# Patient Record
Sex: FEMALE | Race: WHITE | HISPANIC OR LATINO | Employment: FULL TIME | URBAN - METROPOLITAN AREA
[De-identification: names, ages, dates, MRNs, and addresses within clinical notes are randomized per-mention and may not be internally consistent; named-entity substitution may affect disease eponyms.]

---

## 2018-08-08 ENCOUNTER — OFFICE VISIT (OUTPATIENT)
Dept: GASTROENTEROLOGY | Facility: MEDICAL CENTER | Age: 58
End: 2018-08-08
Payer: COMMERCIAL

## 2018-08-08 VITALS
DIASTOLIC BLOOD PRESSURE: 70 MMHG | HEIGHT: 62 IN | WEIGHT: 144 LBS | HEART RATE: 67 BPM | BODY MASS INDEX: 26.5 KG/M2 | TEMPERATURE: 97.2 F | SYSTOLIC BLOOD PRESSURE: 116 MMHG

## 2018-08-08 DIAGNOSIS — Z83.71 FAMILY HISTORY OF COLONIC POLYPS: ICD-10-CM

## 2018-08-08 DIAGNOSIS — R13.19 ESOPHAGEAL DYSPHAGIA: ICD-10-CM

## 2018-08-08 DIAGNOSIS — R10.13 EPIGASTRIC PAIN: Primary | ICD-10-CM

## 2018-08-08 DIAGNOSIS — K21.9 GASTROESOPHAGEAL REFLUX DISEASE WITHOUT ESOPHAGITIS: ICD-10-CM

## 2018-08-08 PROBLEM — Z83.719 FAMILY HISTORY OF COLONIC POLYPS: Status: ACTIVE | Noted: 2018-08-08

## 2018-08-08 PROCEDURE — 99204 OFFICE O/P NEW MOD 45 MIN: CPT | Performed by: INTERNAL MEDICINE

## 2018-08-08 RX ORDER — OMEPRAZOLE 20 MG/1
20 CAPSULE, DELAYED RELEASE ORAL DAILY
Qty: 30 CAPSULE | Refills: 5 | Status: SHIPPED | OUTPATIENT
Start: 2018-08-08 | End: 2018-08-20 | Stop reason: SDUPTHER

## 2018-08-08 NOTE — LETTER
August 8, 2018     Jennifer Sandhu DO  [address] [place] [address]    Patient: Abraham Aguero   YOB: 1960   Date of Visit: 8/8/2018       Dear Dr Wagner Ozuna: Thank you for referring Abraham Aguero to me for evaluation  Below are my notes for this consultation  If you have questions, please do not hesitate to call me  I look forward to following your patient along with you  Sincerely,        Magdiel Kurtz MD        CC: No Recipients  Magdiel Kurtz MD  8/8/2018 11:08 AM  Sign at close encounter  [facility] - Outpatient Consultation  Abraham Aguero 59 y o  female MRN: [id number]  Encounter: [id number]          ASSESSMENT AND PLAN:      1  Epigastric pain  2  Gastroesophageal reflux disease without esophagitis  3  Esophageal dysphagia  I suspect her symptoms may be due to reflux esophagitis and functional dyspepsia but I am also concerned about peptic ulcer disease given her NSAID use  I will schedule her for an upper endoscopy to evaluate further and dilated the stricture is found  I will also give her a trial of omeprazole daily  - Case request operating room: ESOPHAGOGASTRODUODENOSCOPY (EGD)  - omeprazole (PriLOSEC) 20 mg delayed release capsule; Take 1 capsule (20 mg total) by mouth daily  Dispense: 30 capsule; Refill: 5    4  Family history of colonic polyps  Given her family history of colon polyps in her age, she should have an elective colonoscopy performed  Given the severity of her upper GI symptoms we will hold off on prepping her for colonoscopy now but we can address this after her endoscopy and her symptoms have improved  ______________________________________________________________________    HPI:  She presents for evaluation of approximately two months of reflux, dysphagia for solids, and epigastric pain    The pain is sharp at times but it is not associated with any nausea, vomiting, change in bowel habits, bleeding, or weight loss   She has been taking Tums but has not found any relief  She denies any prior history of an upper endoscopy or colonoscopy but does report a family history of colon polyps  REVIEW OF SYSTEMS:    CONSTITUTIONAL: Denies any fever, chills, rigors, and weight loss  HEENT: No earache or tinnitus  Denies hearing loss or visual disturbances  CARDIOVASCULAR: No chest pain or palpitations  RESPIRATORY: Denies any cough, hemoptysis, shortness of breath or dyspnea on exertion  GASTROINTESTINAL: As noted in the History of Present Illness  GENITOURINARY: No problems with urination  Denies any hematuria or dysuria  NEUROLOGIC: No dizziness or vertigo, denies headaches  MUSCULOSKELETAL: Denies any muscle pain, but has joint pains  SKIN: Denies skin rashes or itching  ENDOCRINE: Denies excessive thirst  Denies intolerance to heat or cold  PSYCHOSOCIAL: Denies depression or anxiety  Denies any recent memory loss  Historical Information   Past Medical History:   Diagnosis Date    GERD (gastroesophageal reflux disease)     Hyperlipidemia      History reviewed  No pertinent surgical history  Social History   History   Alcohol Use    Yes     History   Drug Use No     History   Smoking Status    Current Every Day Smoker   Smokeless Tobacco    Never Used     History reviewed  No pertinent family history  Meds/Allergies       Current Outpatient Prescriptions:     Multiple Vitamins-Minerals (MULTIVITAMIN ADULT PO)    omeprazole (PriLOSEC) 20 mg delayed release capsule    No Known Allergies        Objective     Blood pressure 116/70, pulse 67, temperature (!) 97 2 °F (36 2 °C), temperature source Tympanic, height 5' 2" (1 575 m), weight 65 3 kg (144 lb)  Body mass index is 26 34 kg/m²          PHYSICAL EXAM:      General Appearance:   Alert, cooperative, no distress   HEENT:   Normocephalic, atraumatic, anicteric      Neck:  Supple, symmetrical, trachea midline   Lungs:   Clear to auscultation bilaterally; no rales, rhonchi or wheezing; respirations unlabored    Heart[de-identified]   Regular rate and rhythm; no murmur, rub, or gallop  Abdomen:   Soft, epigastric tenderness, non-distended; normal bowel sounds; no masses, no organomegaly    Genitalia:   Deferred    Rectal:   Deferred    Extremities:  No cyanosis, clubbing or edema    Pulses:  2+ and symmetric    Skin:  No jaundice, rashes, or lesions    Lymph nodes:  No palpable cervical lymphadenopathy        Lab Results:   No visits with results within 1 Day(s) from this visit  Latest known visit with results is:   No results found for any previous visit  Radiology Results:   No results found

## 2018-08-08 NOTE — PATIENT INSTRUCTIONS
The patient is scheduled at [facility] on 8/15/18 with Dr Dipika Shore  Pt is aware of prep and that she will be called the day prior with an arrival time

## 2018-08-08 NOTE — PROGRESS NOTES
[facility] - Outpatient Consultation  [facility] 59 y o  female MRN: [id number]  Encounter: [id number]          ASSESSMENT AND PLAN:      1  Epigastric pain  2  Gastroesophageal reflux disease without esophagitis  3  Esophageal dysphagia  I suspect her symptoms may be due to reflux esophagitis and functional dyspepsia but I am also concerned about peptic ulcer disease given her NSAID use  I will schedule her for an upper endoscopy to evaluate further and dilated the stricture is found  I will also give her a trial of omeprazole daily  - Case request operating room: ESOPHAGOGASTRODUODENOSCOPY (EGD)  - omeprazole (PriLOSEC) 20 mg delayed release capsule; Take 1 capsule (20 mg total) by mouth daily  Dispense: 30 capsule; Refill: 5    4  Family history of colonic polyps  Given her family history of colon polyps in her age, she should have an elective colonoscopy performed  Given the severity of her upper GI symptoms we will hold off on prepping her for colonoscopy now but we can address this after her endoscopy and her symptoms have improved  ______________________________________________________________________    HPI:  She presents for evaluation of approximately two months of reflux, dysphagia for solids, and epigastric pain  The pain is sharp at times but it is not associated with any nausea, vomiting, change in bowel habits, bleeding, or weight loss  She has been taking Tums but has not found any relief  She denies any prior history of an upper endoscopy or colonoscopy but does report a family history of colon polyps  REVIEW OF SYSTEMS:    CONSTITUTIONAL: Denies any fever, chills, rigors, and weight loss  HEENT: No earache or tinnitus  Denies hearing loss or visual disturbances  CARDIOVASCULAR: No chest pain or palpitations  RESPIRATORY: Denies any cough, hemoptysis, shortness of breath or dyspnea on exertion    GASTROINTESTINAL: As noted in the History of Present Illness  GENITOURINARY: No problems with urination  Denies any hematuria or dysuria  NEUROLOGIC: No dizziness or vertigo, denies headaches  MUSCULOSKELETAL: Denies any muscle pain, but has joint pains  SKIN: Denies skin rashes or itching  ENDOCRINE: Denies excessive thirst  Denies intolerance to heat or cold  PSYCHOSOCIAL: Denies depression or anxiety  Denies any recent memory loss  Historical Information   Past Medical History:   Diagnosis Date    GERD (gastroesophageal reflux disease)     Hyperlipidemia      History reviewed  No pertinent surgical history  Social History   History   Alcohol Use    Yes     History   Drug Use No     History   Smoking Status    Current Every Day Smoker   Smokeless Tobacco    Never Used     History reviewed  No pertinent family history  Meds/Allergies       Current Outpatient Prescriptions:     Multiple Vitamins-Minerals (MULTIVITAMIN ADULT PO)    omeprazole (PriLOSEC) 20 mg delayed release capsule    No Known Allergies        Objective     Blood pressure 116/70, pulse 67, temperature (!) 97 2 °F (36 2 °C), temperature source Tympanic, height 5' 2" (1 575 m), weight 65 3 kg (144 lb)  Body mass index is 26 34 kg/m²  PHYSICAL EXAM:      General Appearance:   Alert, cooperative, no distress   HEENT:   Normocephalic, atraumatic, anicteric      Neck:  Supple, symmetrical, trachea midline   Lungs:   Clear to auscultation bilaterally; no rales, rhonchi or wheezing; respirations unlabored    Heart[de-identified]   Regular rate and rhythm; no murmur, rub, or gallop  Abdomen:   Soft, epigastric tenderness, non-distended; normal bowel sounds; no masses, no organomegaly    Genitalia:   Deferred    Rectal:   Deferred    Extremities:  No cyanosis, clubbing or edema    Pulses:  2+ and symmetric    Skin:  No jaundice, rashes, or lesions    Lymph nodes:  No palpable cervical lymphadenopathy        Lab Results:   No visits with results within 1 Day(s) from this visit     Latest known visit with results is:   No results found for any previous visit  Radiology Results:   No results found

## 2018-08-14 ENCOUNTER — ANESTHESIA EVENT (OUTPATIENT)
Dept: GASTROENTEROLOGY | Facility: MEDICAL CENTER | Age: 58
End: 2018-08-14
Payer: COMMERCIAL

## 2018-08-15 ENCOUNTER — HOSPITAL ENCOUNTER (OUTPATIENT)
Facility: MEDICAL CENTER | Age: 58
Setting detail: OUTPATIENT SURGERY
Discharge: HOME | End: 2018-08-15
Attending: INTERNAL MEDICINE | Admitting: INTERNAL MEDICINE
Payer: COMMERCIAL

## 2018-08-15 ENCOUNTER — ANESTHESIA (OUTPATIENT)
Dept: GASTROENTEROLOGY | Facility: MEDICAL CENTER | Age: 58
End: 2018-08-15
Payer: COMMERCIAL

## 2018-08-15 VITALS
BODY MASS INDEX: 27.75 KG/M2 | RESPIRATION RATE: 24 BRPM | TEMPERATURE: 97.3 F | SYSTOLIC BLOOD PRESSURE: 192 MMHG | HEIGHT: 61 IN | OXYGEN SATURATION: 99 % | DIASTOLIC BLOOD PRESSURE: 96 MMHG | WEIGHT: 147 LBS | HEART RATE: 62 BPM

## 2018-08-15 DIAGNOSIS — K21.9 GASTROESOPHAGEAL REFLUX DISEASE WITHOUT ESOPHAGITIS: ICD-10-CM

## 2018-08-15 DIAGNOSIS — R10.13 EPIGASTRIC PAIN: ICD-10-CM

## 2018-08-15 DIAGNOSIS — R13.19 ESOPHAGEAL DYSPHAGIA: ICD-10-CM

## 2018-08-15 PROCEDURE — 88305 TISSUE EXAM BY PATHOLOGIST: CPT | Performed by: PATHOLOGY

## 2018-08-15 PROCEDURE — 88342 IMHCHEM/IMCYTCHM 1ST ANTB: CPT | Performed by: PATHOLOGY

## 2018-08-15 PROCEDURE — 43239 EGD BIOPSY SINGLE/MULTIPLE: CPT | Performed by: INTERNAL MEDICINE

## 2018-08-15 RX ORDER — PROPOFOL 10 MG/ML
INJECTION, EMULSION INTRAVENOUS AS NEEDED
Status: DISCONTINUED | OUTPATIENT
Start: 2018-08-15 | End: 2018-08-15 | Stop reason: SURG

## 2018-08-15 RX ORDER — LANOLIN ALCOHOL/MO/W.PET/CERES
CREAM (GRAM) TOPICAL
COMMUNITY

## 2018-08-15 RX ORDER — LIDOCAINE HYDROCHLORIDE 20 MG/ML
INJECTION, SOLUTION EPIDURAL; INFILTRATION; INTRACAUDAL; PERINEURAL AS NEEDED
Status: DISCONTINUED | OUTPATIENT
Start: 2018-08-15 | End: 2018-08-15 | Stop reason: SURG

## 2018-08-15 RX ORDER — SODIUM CHLORIDE 9 MG/ML
125 INJECTION, SOLUTION INTRAVENOUS CONTINUOUS
Status: DISCONTINUED | OUTPATIENT
Start: 2018-08-15 | End: 2018-08-15 | Stop reason: HOSPADM

## 2018-08-15 RX ORDER — CHOLECALCIFEROL (VITAMIN D3) 125 MCG
2000 CAPSULE ORAL DAILY
COMMUNITY

## 2018-08-15 RX ADMIN — PROPOFOL 50 MG: 10 INJECTION, EMULSION INTRAVENOUS at 13:26

## 2018-08-15 RX ADMIN — SODIUM CHLORIDE 125 ML/HR: 0.9 INJECTION, SOLUTION INTRAVENOUS at 12:20

## 2018-08-15 RX ADMIN — LIDOCAINE HYDROCHLORIDE 5 ML: 20 INJECTION, SOLUTION EPIDURAL; INFILTRATION; INTRACAUDAL; PERINEURAL at 13:21

## 2018-08-15 RX ADMIN — PROPOFOL 150 MG: 10 INJECTION, EMULSION INTRAVENOUS at 13:21

## 2018-08-15 NOTE — H&P (VIEW-ONLY)
[facility] - Outpatient Consultation  [facility] 59 y o  female MRN: [id number]  Encounter: [id number]          ASSESSMENT AND PLAN:      1  Epigastric pain  2  Gastroesophageal reflux disease without esophagitis  3  Esophageal dysphagia  I suspect her symptoms may be due to reflux esophagitis and functional dyspepsia but I am also concerned about peptic ulcer disease given her NSAID use  I will schedule her for an upper endoscopy to evaluate further and dilated the stricture is found  I will also give her a trial of omeprazole daily  - Case request operating room: ESOPHAGOGASTRODUODENOSCOPY (EGD)  - omeprazole (PriLOSEC) 20 mg delayed release capsule; Take 1 capsule (20 mg total) by mouth daily  Dispense: 30 capsule; Refill: 5    4  Family history of colonic polyps  Given her family history of colon polyps in her age, she should have an elective colonoscopy performed  Given the severity of her upper GI symptoms we will hold off on prepping her for colonoscopy now but we can address this after her endoscopy and her symptoms have improved  ______________________________________________________________________    HPI:  She presents for evaluation of approximately two months of reflux, dysphagia for solids, and epigastric pain  The pain is sharp at times but it is not associated with any nausea, vomiting, change in bowel habits, bleeding, or weight loss  She has been taking Tums but has not found any relief  She denies any prior history of an upper endoscopy or colonoscopy but does report a family history of colon polyps  REVIEW OF SYSTEMS:    CONSTITUTIONAL: Denies any fever, chills, rigors, and weight loss  HEENT: No earache or tinnitus  Denies hearing loss or visual disturbances  CARDIOVASCULAR: No chest pain or palpitations  RESPIRATORY: Denies any cough, hemoptysis, shortness of breath or dyspnea on exertion    GASTROINTESTINAL: As noted in the History of Present Illness  GENITOURINARY: No problems with urination  Denies any hematuria or dysuria  NEUROLOGIC: No dizziness or vertigo, denies headaches  MUSCULOSKELETAL: Denies any muscle pain, but has joint pains  SKIN: Denies skin rashes or itching  ENDOCRINE: Denies excessive thirst  Denies intolerance to heat or cold  PSYCHOSOCIAL: Denies depression or anxiety  Denies any recent memory loss  Historical Information   Past Medical History:   Diagnosis Date    GERD (gastroesophageal reflux disease)     Hyperlipidemia      History reviewed  No pertinent surgical history  Social History   History   Alcohol Use    Yes     History   Drug Use No     History   Smoking Status    Current Every Day Smoker   Smokeless Tobacco    Never Used     History reviewed  No pertinent family history  Meds/Allergies       Current Outpatient Prescriptions:     Multiple Vitamins-Minerals (MULTIVITAMIN ADULT PO)    omeprazole (PriLOSEC) 20 mg delayed release capsule    No Known Allergies        Objective     Blood pressure 116/70, pulse 67, temperature (!) 97 2 °F (36 2 °C), temperature source Tympanic, height 5' 2" (1 575 m), weight 65 3 kg (144 lb)  Body mass index is 26 34 kg/m²  PHYSICAL EXAM:      General Appearance:   Alert, cooperative, no distress   HEENT:   Normocephalic, atraumatic, anicteric      Neck:  Supple, symmetrical, trachea midline   Lungs:   Clear to auscultation bilaterally; no rales, rhonchi or wheezing; respirations unlabored    Heart[de-identified]   Regular rate and rhythm; no murmur, rub, or gallop  Abdomen:   Soft, epigastric tenderness, non-distended; normal bowel sounds; no masses, no organomegaly    Genitalia:   Deferred    Rectal:   Deferred    Extremities:  No cyanosis, clubbing or edema    Pulses:  2+ and symmetric    Skin:  No jaundice, rashes, or lesions    Lymph nodes:  No palpable cervical lymphadenopathy        Lab Results:   No visits with results within 1 Day(s) from this visit     Latest known visit with results is:   No results found for any previous visit  Radiology Results:   No results found

## 2018-08-15 NOTE — OP NOTE
**** GI/ENDOSCOPY REPORT ****     PATIENT NAME: Levar Shah - VISIT ID:  Patient ID: [id number] YOB: 1960     INTRODUCTION: Esophagogastroduodenoscopy - A 59 female patient presents   for an outpatient Esophagogastroduodenoscopy at [address] [place] [address]  INDICATIONS: GERD  Dysphagia  Pain located in the epigastrium  CONSENT: The benefits, risks, and alternatives to the procedure were   discussed and informed consent was obtained from the patient  PREPARATION:  EKG, pulse, pulse oximetry and blood pressure were monitored   throughout the procedure  ASA Classification: Class 2 - Patient has mild   to moderate systemic disturbance that may or may not be related to the   disorder requiring surgery  MEDICATIONS: Anesthesia-check records     PROCEDURE:  The endoscope was passed without difficulty through the mouth   under direct visualization and advanced to the 2nd portion of the   duodenum  The scope was withdrawn and the mucosa was carefully examined  Retroflexion was performed  FINDINGS:   Esophagus: 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  Multiple random   biopsies was taken from the proximal third of the esophagus  GE junction:   The GE junction appeared to be normal    Stomach: The stomach appeared to   be normal      Multiple random biopsies was taken  Duodenum: A single   sessile polyp (measuring 6 mm in size) was found in the duodenal bulb  The polyp was removed by polypectomy with a cold snare  COMPLICATIONS: There were no complications  IMPRESSIONS: 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  Normal GE junction  Normal   stomach  Multiple biopsies taken  A polyp found in the duodenal bulb  Polypectomy was performed  RECOMMENDATIONS: Anti-reflux measures: Raise the head of the bed 4 to 6   inches  Avoid smoking  Avoid excess coffee, tea or other caffeinated   beverages  Avoid garments that fit tightly through the abdomen   Avoid   eating before bed  Continue current medications  Follow-up on the results   of the biopsy specimens  Follow-up appointment with endoscopist  If   biopsies are negative, will refer for esophageal manometry study  ESTIMATED BLOOD LOSS:     PATHOLOGY SPECIMENS: Multiple random biopsies taken from the proximal   third of the esophagus  Multiple random biopsies taken  Polypectomy   performed, using, cold snare  PROCEDURE CODES:     ICD-9 Codes: 530 81 Esophageal reflux 787 2 DYSPHAGIA 789 06 Abdominal   pain, epigastric 211 2 Benign neoplasm of duodenum, jejunum, and ileum     ICD-10 Codes: K21 Gastro-esophageal reflux disease R13 10 Dysphagia,   unspecified R10 13 Epigastric pain U47 7 Neoplasm of uncertain behavior of   small intestine     PERFORMED BY: KIRILL Vela  on 08/15/2018  Version 1, electronically signed by KIRILL Ramirez  on 08/15/2018   at 13:45

## 2018-08-15 NOTE — ANESTHESIA PREPROCEDURE EVALUATION
Review of Systems/Medical History  Patient summary reviewed        Cardiovascular  Hyperlipidemia,    Pulmonary  Smoker cigarette smoker  , Tobacco cessation counseling given ,        GI/Hepatic    GERD poorly controlled,        Negative  ROS        Endo/Other  Negative endo/other ROS      GYN  Negative gynecology ROS          Hematology  Negative hematology ROS      Musculoskeletal  Negative musculoskeletal ROS        Neurology  Negative neurology ROS      Psychology   Negative psychology ROS              Physical Exam    Airway    Mallampati score: I  TM Distance: >3 FB  Neck ROM: full     Dental   No notable dental hx     Cardiovascular  Rhythm: regular, Rate: normal, Cardiovascular exam normal    Pulmonary  Pulmonary exam normal Breath sounds clear to auscultation,     Other Findings        Anesthesia Plan  ASA Score- 2     Anesthesia Type- IV sedation with anesthesia with ASA Monitors  Additional Monitors:   Airway Plan:         Plan Factors- Patient instructed to abstain from smoking on day of procedure  Patient did not smoke on day of surgery  Induction- intravenous  Postoperative Plan-     Informed Consent- Anesthetic plan and risks discussed with patient

## 2018-08-15 NOTE — DISCHARGE INSTRUCTIONS
Upper Endoscopy   WHAT YOU NEED TO KNOW:   An upper endoscopy is also called an upper gastrointestinal (GI) endoscopy, or an esophagogastroduodenoscopy (EGD)  You may feel bloated, gassy, or have some abdominal discomfort after your procedure  Your throat may be sore for 24 to 36 hours  You may burp or pass gas from air that is still inside your body  DISCHARGE INSTRUCTIONS:   Call 911 for any of the following:   · You have sudden chest pain or trouble breathing  Seek care immediately if:   · You feel dizzy or faint  · You have trouble swallowing  · Your bowel movements are very dark or black  · Your abdomen is hard and firm and you have severe pain  · You vomit blood  Contact your healthcare provider if:   · You feel full or bloated and cannot burp or pass gas  · You have not had a bowel movement for 3 days after your procedure  · You have neck pain  · You have a fever or chills  · You have nausea or are vomiting  · You have a rash or hives  · You have questions or concerns about your endoscopy  Relieve a sore throat:  Suck on throat lozenges or crushed ice  Gargle with a small amount of warm salt water  Mix 1 teaspoon of salt and 1 cup of warm water to make salt water  Relieve gas and discomfort from bloating:  Lie on your right side with a heating pad on your abdomen  Take short walks to help pass gas  Eat small meals until bloating is relieved  Rest after your procedure: You have been given medicine to relax you  Do not  drive or make important decisions until the day after your procedure  Return to your normal activity as directed  You can usually return to work the day after your procedure  Follow up with your healthcare provider as directed:  Write down your questions so you remember to ask them during your visits     © 2017 [address] is for End User's use only and may not be sold, redistributed or otherwise used for commercial purposes  All illustrations and images included in CareNotes® are the copyrighted property of A D A M [employer] Inc  or [employer]  The above information is an  only  It is not intended as medical advice for individual conditions or treatments  Talk to your doctor, nurse or pharmacist before following any medical regimen to see if it is safe and effective for you

## 2018-08-15 NOTE — ANESTHESIA POSTPROCEDURE EVALUATION
Post-Op Assessment Note      CV Status:  Stable    Mental Status:  Alert and awake    Hydration Status:  Euvolemic    PONV Controlled:  Controlled    Airway Patency:  Patent    Post Op Vitals Reviewed: Yes          Staff: Anesthesiologist           BP     Temp     Pulse     Resp     SpO2

## 2018-08-16 ENCOUNTER — TELEPHONE (OUTPATIENT)
Dept: GASTROENTEROLOGY | Facility: CLINIC | Age: 58
End: 2018-08-16

## 2018-08-16 NOTE — TELEPHONE ENCOUNTER
Looks like it was originally prescribed at her office visit  There were no findings of inflammation on EGD so she does not have to start it if she does not want to, but Dr Mireille Fisher felt it may be helpful for her symptoms  She can try it, or if she is feeling better she can hold off  Thanks!

## 2018-08-16 NOTE — TELEPHONE ENCOUNTER
Dr Pavel Cutler pt    Pt called in, she is questioning if she should be starting Omeprazole or holding off on this medication   It was prescribed to her yesterday, after her -[phone]

## 2018-08-19 NOTE — PROGRESS NOTES
Gastric biopsy was positive for H pylori  My physician assistant will let her know that she is positive for H pylori and initiate treatment

## 2018-08-20 DIAGNOSIS — A04.8 H. PYLORI INFECTION: Primary | ICD-10-CM

## 2018-08-20 DIAGNOSIS — R10.13 EPIGASTRIC PAIN: ICD-10-CM

## 2018-08-20 DIAGNOSIS — R13.19 ESOPHAGEAL DYSPHAGIA: ICD-10-CM

## 2018-08-20 DIAGNOSIS — K21.9 GASTROESOPHAGEAL REFLUX DISEASE WITHOUT ESOPHAGITIS: ICD-10-CM

## 2018-08-20 RX ORDER — AMOXICILLIN 500 MG/1
1000 CAPSULE ORAL EVERY 12 HOURS SCHEDULED
Qty: 56 CAPSULE | Refills: 0 | Status: SHIPPED | OUTPATIENT
Start: 2018-08-20 | End: 2018-09-03

## 2018-08-20 RX ORDER — OMEPRAZOLE 20 MG/1
20 CAPSULE, DELAYED RELEASE ORAL 2 TIMES DAILY
Qty: 30 CAPSULE | Refills: 0 | Status: SHIPPED | OUTPATIENT
Start: 2018-08-20

## 2018-08-20 RX ORDER — CLARITHROMYCIN 500 MG/1
500 TABLET, COATED ORAL EVERY 12 HOURS SCHEDULED
Qty: 28 TABLET | Refills: 0 | Status: SHIPPED | OUTPATIENT
Start: 2018-08-20 | End: 2018-09-03

## 2018-08-20 NOTE — TELEPHONE ENCOUNTER
Pt called requesting information on why she was prescribed medications today because DR Amie López advised her that she will not go on medication

## 2018-08-24 ENCOUNTER — TELEPHONE (OUTPATIENT)
Dept: GASTROENTEROLOGY | Facility: CLINIC | Age: 58
End: 2018-08-24

## 2018-08-24 NOTE — TELEPHONE ENCOUNTER
José Rain patient      She picked up her medication and has questions regarding the directions CALLBACK # [phone]

## 2018-10-02 ENCOUNTER — OFFICE VISIT (OUTPATIENT)
Dept: GASTROENTEROLOGY | Facility: MEDICAL CENTER | Age: 58
End: 2018-10-02
Payer: COMMERCIAL

## 2018-10-02 VITALS
SYSTOLIC BLOOD PRESSURE: 118 MMHG | DIASTOLIC BLOOD PRESSURE: 70 MMHG | WEIGHT: 142 LBS | BODY MASS INDEX: 26.81 KG/M2 | TEMPERATURE: 96.9 F | HEIGHT: 61 IN | HEART RATE: 77 BPM

## 2018-10-02 DIAGNOSIS — A04.8 H. PYLORI INFECTION: ICD-10-CM

## 2018-10-02 DIAGNOSIS — R10.13 EPIGASTRIC PAIN: ICD-10-CM

## 2018-10-02 DIAGNOSIS — Z83.71 FAMILY HISTORY OF COLONIC POLYPS: ICD-10-CM

## 2018-10-02 DIAGNOSIS — R13.19 ESOPHAGEAL DYSPHAGIA: ICD-10-CM

## 2018-10-02 DIAGNOSIS — K21.9 GASTROESOPHAGEAL REFLUX DISEASE WITHOUT ESOPHAGITIS: Primary | ICD-10-CM

## 2018-10-02 PROCEDURE — 99214 OFFICE O/P EST MOD 30 MIN: CPT | Performed by: INTERNAL MEDICINE

## 2018-10-02 RX ORDER — POLYETHYLENE GLYCOL 3350, SODIUM SULFATE, SODIUM CHLORIDE, POTASSIUM CHLORIDE, ASCORBIC ACID, SODIUM ASCORBATE 7.5-2.691G
2000 KIT ORAL ONCE
Qty: 2000 ML | Refills: 0 | Status: SHIPPED | OUTPATIENT
Start: 2018-10-02 | End: 2018-10-02

## 2018-10-02 NOTE — LETTER
October 2, 2018     Nelta Litten, DO  [address] [facility] [address]    Patient: Man Reaves   YOB: 1960   Date of Visit: 10/2/2018       Dear Dr Scot Sofia: Thank you for referring Man Reaves to me for evaluation  Below are my notes for this consultation  If you have questions, please do not hesitate to call me  I look forward to following your patient along with you  Sincerely,        Ale Mendez MD        CC: No Recipients  Ale Mendez MD  10/2/2018  9:15 AM  Sign at close encounter  [facility] - Outpatient Follow-up Note  Man Reaves 59 y o  female MRN: [id number]  Encounter: [id number]          ASSESSMENT AND PLAN:      1  Gastroesophageal reflux disease without esophagitis  2  Esophageal dysphagia  3  Epigastric pain  4  H  pylori infection    I suspect her symptoms were mainly due to reflux and her Helicobacter pylori infection  I am very happy to hear she is feeling better now  She will stop taking the Prilosec at the end of October and then have her Helicobacter pylori stool antigen checked about two weeks later  If she has a recurrence of her reflux symptoms are epigastric pain that she can restart the Prilosec after her stool test   - H  pylori antigen, stool; Future    5  Family history of colonic polyps    She has a family history of colon polyps so her next colonoscopy should be in approximately five years or sooner if clinically indicated     ______________________________________________________________________    SUBJECTIVE:  She presents for follow-up after recent upper endoscopy and colonoscopy because of reflux, difficulty swallowing, epigastric pain, and a family history of colon polyps  Her colonoscopy was unremarkable and she was found to have Helicobacter pylori infection on the upper endoscopy  The rest of her biopsies were negative    She has been taking Prilosec and she took triple therapy for the infection for about two weeks and felt her symptoms completely resolved  She is now asymptomatic on the Prilosec  REVIEW OF SYSTEMS IS OTHERWISE NEGATIVE  Historical Information   Past Medical History:   Diagnosis Date    GERD (gastroesophageal reflux disease)     Hyperlipidemia      Past Surgical History:   Procedure Laterality Date    COMBINED REDUCTION MAMMAPLASTY W/ LIPOSUCTION      MT ESOPHAGOGASTRODUODENOSCOPY TRANSORAL DIAGNOSTIC N/A 8/15/2018    Procedure: ESOPHAGOGASTRODUODENOSCOPY (EGD); Surgeon: Ismael Craig MD;  Location: [facility]; Service: Gastroenterology     Social History   History   Alcohol Use    Yes     History   Drug Use No     History   Smoking Status    Current Every Day Smoker    Packs/day: 0 25   Smokeless Tobacco    Never Used     History reviewed  No pertinent family history  Meds/Allergies       Current Outpatient Prescriptions:     Biotin 300 MCG TABS    Cholecalciferol (VITAMIN D3) 2000 units TABS    Multiple Vitamins-Minerals (MULTIVITAMIN ADULT PO)    omeprazole (PriLOSEC) 20 mg delayed release capsule    No Known Allergies        Objective     Blood pressure 118/70, pulse 77, temperature (!) 96 9 °F (36 1 °C), temperature source Tympanic, height 5' 1" (1 549 m), weight 64 4 kg (142 lb)  Body mass index is 26 83 kg/m²  PHYSICAL EXAM:      General Appearance:   Alert, cooperative, no distress   HEENT:   Normocephalic, atraumatic, anicteric      Neck:  Supple, symmetrical, trachea midline   Lungs:   Clear to auscultation bilaterally; no rales, rhonchi or wheezing; respirations unlabored    Heart[de-identified]   Regular rate and rhythm; no murmur, rub, or gallop     Abdomen:   Soft, non-tender, non-distended; normal bowel sounds; no masses, no organomegaly    Genitalia:   Deferred    Rectal:   Deferred    Extremities:  No cyanosis, clubbing or edema    Pulses:  2+ and symmetric    Skin:  No jaundice, rashes, or lesions    Lymph nodes:  No palpable cervical lymphadenopathy        Lab Results:   No visits with results within 1 Day(s) from this visit  Latest known visit with results is:   Admission on 08/15/2018, Discharged on 08/15/2018   Component Date Value    Case Report 08/15/2018                      Value:Surgical Pathology Report                         Case: [id number]                                   Authorizing Provider:  Krystal Andujar MD           Collected:           08/15/2018 1327              Ordering Location:     [facility]        Received:            08/15/2018 [facility]                                                     Pathologist:           Janie Sandhu MD                                                        Specimens:   A) - Polyp, Stomach/Small Intestine, duodenal polyp cold snare                                      B) - Stomach, gastric bx's r/o h pylori                                                             C) - Esophagus, proximal esophagus bx's r/o EOE                                            Final Diagnosis 08/15/2018                      Value: This result contains rich text formatting which cannot be displayed here   Additional Information 08/15/2018                      Value: This result contains rich text formatting which cannot be displayed here  Lacy Hampton Gross Description 08/15/2018                      Value: This result contains rich text formatting which cannot be displayed here  Radiology Results:   No results found

## 2018-10-02 NOTE — PROGRESS NOTES
[facility] - Outpatient Follow-up Note  Estee Ibrahim 59 y o  female MRN: [id number]  Encounter: [id number]          ASSESSMENT AND PLAN:      1  Gastroesophageal reflux disease without esophagitis  2  Esophageal dysphagia  3  Epigastric pain  4  H  pylori infection    I suspect her symptoms were mainly due to reflux and her Helicobacter pylori infection  I am very happy to hear she is feeling better now  She will stop taking the Prilosec at the end of October and then have her Helicobacter pylori stool antigen checked about two weeks later  If she has a recurrence of her reflux symptoms are epigastric pain that she can restart the Prilosec after her stool test   - H  pylori antigen, stool; Future    5  Family history of colonic polyps    She has a family history of colon polyps so I will schedule her for colonoscopy now     ______________________________________________________________________    SUBJECTIVE:  She presents for follow-up after recent upper endoscopy and colonoscopy because of reflux, difficulty swallowing, epigastric pain, and a family history of colon polyps  She was found to have Helicobacter pylori infection on the upper endoscopy  The rest of her biopsies were negative  She has been taking Prilosec and she took triple therapy for the infection for about two weeks and felt her symptoms completely resolved  She is now asymptomatic on the Prilosec  REVIEW OF SYSTEMS IS OTHERWISE NEGATIVE  Historical Information   Past Medical History:   Diagnosis Date    GERD (gastroesophageal reflux disease)     Hyperlipidemia      Past Surgical History:   Procedure Laterality Date    COMBINED REDUCTION MAMMAPLASTY W/ LIPOSUCTION      OR ESOPHAGOGASTRODUODENOSCOPY TRANSORAL DIAGNOSTIC N/A 8/15/2018    Procedure: ESOPHAGOGASTRODUODENOSCOPY (EGD); Surgeon: Alberto Ervin MD;  Location: [facility];   Service: Gastroenterology     Social History   History   Alcohol Use  Yes     History   Drug Use No     History   Smoking Status    Current Every Day Smoker    Packs/day: 0 25   Smokeless Tobacco    Never Used     History reviewed  No pertinent family history  Meds/Allergies       Current Outpatient Prescriptions:     Biotin 300 MCG TABS    Cholecalciferol (VITAMIN D3) 2000 units TABS    Multiple Vitamins-Minerals (MULTIVITAMIN ADULT PO)    omeprazole (PriLOSEC) 20 mg delayed release capsule    No Known Allergies        Objective     Blood pressure 118/70, pulse 77, temperature (!) 96 9 °F (36 1 °C), temperature source Tympanic, height 5' 1" (1 549 m), weight 64 4 kg (142 lb)  Body mass index is 26 83 kg/m²  PHYSICAL EXAM:      General Appearance:   Alert, cooperative, no distress   HEENT:   Normocephalic, atraumatic, anicteric      Neck:  Supple, symmetrical, trachea midline   Lungs:   Clear to auscultation bilaterally; no rales, rhonchi or wheezing; respirations unlabored    Heart[de-identified]   Regular rate and rhythm; no murmur, rub, or gallop  Abdomen:   Soft, non-tender, non-distended; normal bowel sounds; no masses, no organomegaly    Genitalia:   Deferred    Rectal:   Deferred    Extremities:  No cyanosis, clubbing or edema    Pulses:  2+ and symmetric    Skin:  No jaundice, rashes, or lesions    Lymph nodes:  No palpable cervical lymphadenopathy        Lab Results:   No visits with results within 1 Day(s) from this visit     Latest known visit with results is:   Admission on 08/15/2018, Discharged on 08/15/2018   Component Date Value    Case Report 08/15/2018                      Value:Surgical Pathology Report                         Case: [id number]                                   Authorizing Provider:  Alberto Ervin MD           Collected:           08/15/2018 1327              Ordering Location:     [facility]        Received:            08/15/2018 [facility] Pathologist:           Elenita Melton MD                                                        Specimens:   A) - Polyp, Stomach/Small Intestine, duodenal polyp cold snare                                      B) - Stomach, gastric bx's r/o h pylori                                                             C) - Esophagus, proximal esophagus bx's r/o EOE                                            Final Diagnosis 08/15/2018                      Value: This result contains rich text formatting which cannot be displayed here   Additional Information 08/15/2018                      Value: This result contains rich text formatting which cannot be displayed here  [facility] Gross Description 08/15/2018                      Value: This result contains rich text formatting which cannot be displayed here  Radiology Results:   No results found

## 2018-10-02 NOTE — LETTER
October 2, 2018     Anastasiya LundborgDO  [address] [facility] [address]    Patient: Rosario Hutson   YOB: 1960   Date of Visit: 10/2/2018       Dear Dr Jose Rafael Olvera: Thank you for referring Rosario Hutson to me for evaluation  Below are my notes for this consultation  If you have questions, please do not hesitate to call me  I look forward to following your patient along with you  Sincerely,        Zabrina Metcalf MD        CC: No Recipients  Zabrina Metcalf MD  10/2/2018  9:20 AM  Sign at close encounter  [facility] - Outpatient Follow-up Note  Rosario Hutson 59 y o  female MRN: [id number]  Encounter: [id number]          ASSESSMENT AND PLAN:      1  Gastroesophageal reflux disease without esophagitis  2  Esophageal dysphagia  3  Epigastric pain  4  H  pylori infection    I suspect her symptoms were mainly due to reflux and her Helicobacter pylori infection  I am very happy to hear she is feeling better now  She will stop taking the Prilosec at the end of October and then have her Helicobacter pylori stool antigen checked about two weeks later  If she has a recurrence of her reflux symptoms are epigastric pain that she can restart the Prilosec after her stool test   - H  pylori antigen, stool; Future    5  Family history of colonic polyps    She has a family history of colon polyps so I will schedule her for colonoscopy now     ______________________________________________________________________    SUBJECTIVE:  She presents for follow-up after recent upper endoscopy and colonoscopy because of reflux, difficulty swallowing, epigastric pain, and a family history of colon polyps  Her colonoscopy was unremarkable and she was found to have Helicobacter pylori infection on the upper endoscopy  The rest of her biopsies were negative    She has been taking Prilosec and she took triple therapy for the infection for about two weeks and felt her symptoms completely resolved  She is now asymptomatic on the Prilosec  REVIEW OF SYSTEMS IS OTHERWISE NEGATIVE  Historical Information   Past Medical History:   Diagnosis Date    GERD (gastroesophageal reflux disease)     Hyperlipidemia      Past Surgical History:   Procedure Laterality Date    COMBINED REDUCTION MAMMAPLASTY W/ LIPOSUCTION      MS ESOPHAGOGASTRODUODENOSCOPY TRANSORAL DIAGNOSTIC N/A 8/15/2018    Procedure: ESOPHAGOGASTRODUODENOSCOPY (EGD); Surgeon: Jacklyn Souza MD;  Location: [facility]; Service: Gastroenterology     Social History   History   Alcohol Use    Yes     History   Drug Use No     History   Smoking Status    Current Every Day Smoker    Packs/day: 0 25   Smokeless Tobacco    Never Used     History reviewed  No pertinent family history  Meds/Allergies       Current Outpatient Prescriptions:     Biotin 300 MCG TABS    Cholecalciferol (VITAMIN D3) 2000 units TABS    Multiple Vitamins-Minerals (MULTIVITAMIN ADULT PO)    omeprazole (PriLOSEC) 20 mg delayed release capsule    No Known Allergies        Objective     Blood pressure 118/70, pulse 77, temperature (!) 96 9 °F (36 1 °C), temperature source Tympanic, height 5' 1" (1 549 m), weight 64 4 kg (142 lb)  Body mass index is 26 83 kg/m²  PHYSICAL EXAM:      General Appearance:   Alert, cooperative, no distress   HEENT:   Normocephalic, atraumatic, anicteric      Neck:  Supple, symmetrical, trachea midline   Lungs:   Clear to auscultation bilaterally; no rales, rhonchi or wheezing; respirations unlabored    Heart[de-identified]   Regular rate and rhythm; no murmur, rub, or gallop     Abdomen:   Soft, non-tender, non-distended; normal bowel sounds; no masses, no organomegaly    Genitalia:   Deferred    Rectal:   Deferred    Extremities:  No cyanosis, clubbing or edema    Pulses:  2+ and symmetric    Skin:  No jaundice, rashes, or lesions    Lymph nodes:  No palpable cervical lymphadenopathy        Lab Results:   No visits with results within 1 Day(s) from this visit  Latest known visit with results is:   Admission on 08/15/2018, Discharged on 08/15/2018   Component Date Value    Case Report 08/15/2018                      Value:Surgical Pathology Report                         Case: [id number]                                   Authorizing Provider:  Elen Rios MD           Collected:           08/15/2018 1327              [facility]        Received:            08/15/2018 [address] [facility]                                                     Pathologist:           Chetna Perdomo MD                                                        Specimens:   A) - Polyp, Stomach/Small Intestine, duodenal polyp cold snare                                      B) - Stomach, gastric bx's r/o h pylori                                                             C) - Esophagus, proximal esophagus bx's r/o EOE                                            Final Diagnosis 08/15/2018                      Value: This result contains rich text formatting which cannot be displayed here   Additional Information 08/15/2018                      Value: This result contains rich text formatting which cannot be displayed here  Dane [facility] Gross Description 08/15/2018                      Value: This result contains rich text formatting which cannot be displayed here  Radiology Results:   No results found

## 2018-10-02 NOTE — PATIENT INSTRUCTIONS
The patient is scheduled at [facility] on 10/23/18 with Dr Bettye Emanuel  Mov[employer] instructions were gone over with by the MA  The pt is aware she will be called the day prior with an arrival time

## 2018-10-22 ENCOUNTER — ANESTHESIA EVENT (OUTPATIENT)
Dept: GASTROENTEROLOGY | Facility: MEDICAL CENTER | Age: 58
End: 2018-10-22
Payer: COMMERCIAL

## 2018-10-23 ENCOUNTER — HOSPITAL ENCOUNTER (OUTPATIENT)
Facility: MEDICAL CENTER | Age: 58
Setting detail: OUTPATIENT SURGERY
Discharge: HOME | End: 2018-10-23
Attending: INTERNAL MEDICINE | Admitting: INTERNAL MEDICINE
Payer: COMMERCIAL

## 2018-10-23 ENCOUNTER — ANESTHESIA (OUTPATIENT)
Dept: GASTROENTEROLOGY | Facility: MEDICAL CENTER | Age: 58
End: 2018-10-23
Payer: COMMERCIAL

## 2018-10-23 VITALS
BODY MASS INDEX: 26.81 KG/M2 | HEIGHT: 61 IN | HEART RATE: 62 BPM | RESPIRATION RATE: 22 BRPM | OXYGEN SATURATION: 98 % | WEIGHT: 142 LBS | SYSTOLIC BLOOD PRESSURE: 163 MMHG | TEMPERATURE: 98.4 F | DIASTOLIC BLOOD PRESSURE: 73 MMHG

## 2018-10-23 DIAGNOSIS — Z83.71 FAMILY HISTORY OF COLONIC POLYPS: ICD-10-CM

## 2018-10-23 PROCEDURE — 45385 COLONOSCOPY W/LESION REMOVAL: CPT | Performed by: INTERNAL MEDICINE

## 2018-10-23 PROCEDURE — 88305 TISSUE EXAM BY PATHOLOGIST: CPT | Performed by: PATHOLOGY

## 2018-10-23 PROCEDURE — 45380 COLONOSCOPY AND BIOPSY: CPT | Performed by: INTERNAL MEDICINE

## 2018-10-23 RX ORDER — PROPOFOL 10 MG/ML
INJECTION, EMULSION INTRAVENOUS AS NEEDED
Status: DISCONTINUED | OUTPATIENT
Start: 2018-10-23 | End: 2018-10-23 | Stop reason: SURG

## 2018-10-23 RX ORDER — LIDOCAINE HYDROCHLORIDE 20 MG/ML
INJECTION, SOLUTION EPIDURAL; INFILTRATION; INTRACAUDAL; PERINEURAL AS NEEDED
Status: DISCONTINUED | OUTPATIENT
Start: 2018-10-23 | End: 2018-10-23 | Stop reason: SURG

## 2018-10-23 RX ORDER — SODIUM CHLORIDE 9 MG/ML
125 INJECTION, SOLUTION INTRAVENOUS CONTINUOUS
Status: DISCONTINUED | OUTPATIENT
Start: 2018-10-23 | End: 2018-10-23 | Stop reason: HOSPADM

## 2018-10-23 RX ADMIN — PROPOFOL 50 MG: 10 INJECTION, EMULSION INTRAVENOUS at 14:55

## 2018-10-23 RX ADMIN — PROPOFOL 150 MG: 10 INJECTION, EMULSION INTRAVENOUS at 14:49

## 2018-10-23 RX ADMIN — SODIUM CHLORIDE 125 ML/HR: 0.9 INJECTION, SOLUTION INTRAVENOUS at 14:21

## 2018-10-23 RX ADMIN — LIDOCAINE HYDROCHLORIDE 100 MG: 20 INJECTION, SOLUTION EPIDURAL; INFILTRATION; INTRACAUDAL; PERINEURAL at 14:49

## 2018-10-23 NOTE — OP NOTE
Colonoscopy Procedure Note    Procedure: Colonoscopy    Sedation: Monitored anesthesia care, check anesthesia records      ASA Class: 2    INDICATIONS:  Family history of colon polyps    POST-OP DIAGNOSIS: See the impression below    Procedure Details     Prior colonoscopy: No prior colonoscopy  Informed consent was obtained for the procedure, including sedation  Risks of perforation, hemorrhage, adverse drug reaction and aspiration were discussed  The patient was placed in the left lateral decubitus position  Based on the pre-procedure assessment, including review of the patient's medical history, medications, allergies, and review of systems, she had been deemed to be an appropriate candidate for conscious sedation; she was therefore sedated with the medications listed below  The patient was monitored continuously with telemetry, pulse oximetry, blood pressure monitoring, and direct observations  A rectal examination was performed  The colonoscope was inserted into the rectum and advanced under direct vision to the cecum, which was identified by the ileocecal valve and appendiceal orifice  The quality of the colonic preparation was good  A careful inspection was made as the colonoscope was withdrawn, including a retroflexed view of the rectum; findings and interventions are described below  Findings: There was a 6 mm polyp in the descending colon that was removed with cold snare polypectomy  There were two small sessile polyps in the sigmoid colon measuring about 3 mm each that were both removed with cold forceps polypectomy  There was an 8 mm polyp in the rectum that was removed with cold snare polypectomy  Retroflexed view of the rectum was unremarkable  Complications: None; patient tolerated the procedure well  Impression:    Four colon polyps removed    Recommendations:  Repeat colonoscopy in 3 years or sooner if clinically indicated      Repeat colonoscopy is being recommended at an interval of less than 10 years, this is because of a personal history of polyps or colon cancer    Await pathology results

## 2018-10-23 NOTE — H&P (VIEW-ONLY)
[facility] - Outpatient Follow-up Note  Dipika Morrison 59 y o  female MRN: [id number]  Encounter: [id number]          ASSESSMENT AND PLAN:      1  Gastroesophageal reflux disease without esophagitis  2  Esophageal dysphagia  3  Epigastric pain  4  H  pylori infection    I suspect her symptoms were mainly due to reflux and her Helicobacter pylori infection  I am very happy to hear she is feeling better now  She will stop taking the Prilosec at the end of October and then have her Helicobacter pylori stool antigen checked about two weeks later  If she has a recurrence of her reflux symptoms are epigastric pain that she can restart the Prilosec after her stool test   - H  pylori antigen, stool; Future    5  Family history of colonic polyps    She has a family history of colon polyps so I will schedule her for colonoscopy now     ______________________________________________________________________    SUBJECTIVE:  She presents for follow-up after recent upper endoscopy and colonoscopy because of reflux, difficulty swallowing, epigastric pain, and a family history of colon polyps  She was found to have Helicobacter pylori infection on the upper endoscopy  The rest of her biopsies were negative  She has been taking Prilosec and she took triple therapy for the infection for about two weeks and felt her symptoms completely resolved  She is now asymptomatic on the Prilosec  REVIEW OF SYSTEMS IS OTHERWISE NEGATIVE  Historical Information   Past Medical History:   Diagnosis Date    GERD (gastroesophageal reflux disease)     Hyperlipidemia      Past Surgical History:   Procedure Laterality Date    COMBINED REDUCTION MAMMAPLASTY W/ LIPOSUCTION      NV ESOPHAGOGASTRODUODENOSCOPY TRANSORAL DIAGNOSTIC N/A 8/15/2018    Procedure: ESOPHAGOGASTRODUODENOSCOPY (EGD); Surgeon: Jacklyn Souza MD;  Location: [facility];   Service: Gastroenterology     Social History   History   Alcohol Use  Yes     History   Drug Use No     History   Smoking Status    Current Every Day Smoker    Packs/day: 0 25   Smokeless Tobacco    Never Used     History reviewed  No pertinent family history  Meds/Allergies       Current Outpatient Prescriptions:     Biotin 300 MCG TABS    Cholecalciferol (VITAMIN D3) 2000 units TABS    Multiple Vitamins-Minerals (MULTIVITAMIN ADULT PO)    omeprazole (PriLOSEC) 20 mg delayed release capsule    No Known Allergies        Objective     Blood pressure 118/70, pulse 77, temperature (!) 96 9 °F (36 1 °C), temperature source Tympanic, height 5' 1" (1 549 m), weight 64 4 kg (142 lb)  Body mass index is 26 83 kg/m²  PHYSICAL EXAM:      General Appearance:   Alert, cooperative, no distress   HEENT:   Normocephalic, atraumatic, anicteric      Neck:  Supple, symmetrical, trachea midline   Lungs:   Clear to auscultation bilaterally; no rales, rhonchi or wheezing; respirations unlabored    Heart[de-identified]   Regular rate and rhythm; no murmur, rub, or gallop  Abdomen:   Soft, non-tender, non-distended; normal bowel sounds; no masses, no organomegaly    Genitalia:   Deferred    Rectal:   Deferred    Extremities:  No cyanosis, clubbing or edema    Pulses:  2+ and symmetric    Skin:  No jaundice, rashes, or lesions    Lymph nodes:  No palpable cervical lymphadenopathy        Lab Results:   No visits with results within 1 Day(s) from this visit     Latest known visit with results is:   Admission on 08/15/2018, Discharged on 08/15/2018   Component Date Value    Case Report 08/15/2018                      Value:Surgical Pathology Report                         Case: [id number]                                   Authorizing Provider:  Zabrina Metcalf MD           Collected:           08/15/2018 1327              Ordering Location:     [facility]        Received:            08/15/2018 [facility] Pathologist:           Josh Hernandez MD                                                        Specimens:   A) - Polyp, Stomach/Small Intestine, duodenal polyp cold snare                                      B) - Stomach, gastric bx's r/o h pylori                                                             C) - Esophagus, proximal esophagus bx's r/o EOE                                            Final Diagnosis 08/15/2018                      Value: This result contains rich text formatting which cannot be displayed here   Additional Information 08/15/2018                      Value: This result contains rich text formatting which cannot be displayed here  [employer] Gross Description 08/15/2018                      Value: This result contains rich text formatting which cannot be displayed here  Radiology Results:   No results found

## 2018-10-23 NOTE — DISCHARGE INSTRUCTIONS
Upper Endoscopy   WHAT YOU NEED TO KNOW:   An upper endoscopy is also called an upper gastrointestinal (GI) endoscopy, or an esophagogastroduodenoscopy (EGD)  You may feel bloated, gassy, or have some abdominal discomfort after your procedure  Your throat may be sore for 24 to 36 hours  You may burp or pass gas from air that is still inside your body  DISCHARGE INSTRUCTIONS:   Call 911 for any of the following:   · You have sudden chest pain or trouble breathing  Seek care immediately if:   · You feel dizzy or faint  · You have trouble swallowing  · Your bowel movements are very dark or black  · Your abdomen is hard and firm and you have severe pain  · You vomit blood  Contact your healthcare provider if:   · You feel full or bloated and cannot burp or pass gas  · You have not had a bowel movement for 3 days after your procedure  · You have neck pain  · You have a fever or chills  · You have nausea or are vomiting  · You have a rash or hives  · You have questions or concerns about your endoscopy  Relieve a sore throat:  Suck on throat lozenges or crushed ice  Gargle with a small amount of warm salt water  Mix 1 teaspoon of salt and 1 cup of warm water to make salt water  Relieve gas and discomfort from bloating:  Lie on your right side with a heating pad on your abdomen  Take short walks to help pass gas  Eat small meals until bloating is relieved  Rest after your procedure: You have been given medicine to relax you  Do not  drive or make important decisions until the day after your procedure  Return to your normal activity as directed  You can usually return to work the day after your procedure  Follow up with your healthcare provider as directed:  Write down your questions so you remember to ask them during your visits     © 2017 [address] is for End User's use only and may not be sold, redistributed or otherwise used for commercial purposes  All illustrations and images included in CareNotes® are the copyrighted property of [email or web address]  or [employer]  The above information is an  only  It is not intended as medical advice for individual conditions or treatments  Talk to your doctor, nurse or pharmacist before following any medical regimen to see if it is safe and effective for you  Colonoscopy   WHAT YOU NEED TO KNOW:   A colonoscopy is a procedure to examine the inside of your colon (intestine) with a scope  Polyps or tissue growths may have been removed during your colonoscopy  It is normal to feel bloated and to have some abdominal discomfort  You should be passing gas  If you have hemorrhoids or you had polyps removed, you may have a small amount of bleeding  DISCHARGE INSTRUCTIONS:   Seek care immediately if:   · You have a large amount of bright red blood in your bowel movements  · Your abdomen is hard and firm and you have severe pain  · You have sudden trouble breathing  Contact your healthcare provider if:   · You develop a rash or hives  · You have a fever within 24 hours of your procedure       · You have not had a bowel movement for 3 days after your procedure  · You have questions or concerns about your condition or care  Activity:   · Do not lift, strain, or run  for 3 days after your procedure  · Rest after your procedure  You have been given medicine to relax you  Do not  drive or make important decisions until the day after your procedure  Return to your normal activity as directed  · Relieve gas and discomfort from bloating  by lying on your right side with a heating pad on your abdomen  You may need to take short walks to help the gas move out  Eat small meals until bloating is relieved  If you had polyps removed: For 7 days after your procedure:  · Do not  take aspirin  · Do not  go on long car rides    Follow up with your healthcare provider as directed: Write down your questions so you remember to ask them during your visits  © 2017 [address] is for End User's use only and may not be sold, redistributed or otherwise used for commercial purposes  All illustrations and images included in CareNotes® are the copyrighted property of [employer]  or Rey Craig  The above information is an  only  It is not intended as medical advice for individual conditions or treatments  Talk to your doctor, nurse or pharmacist before following any medical regimen to see if it is safe and effective for you  Colorectal Polyps   WHAT YOU NEED TO KNOW:   What are colorectal polyps? Colorectal polyps are small growths of tissue in the lining of the colon and rectum  Most polyps are hyperplastic polyps and are usually benign (noncancerous)  Certain types of polyps, called adenomatous polyps, may turn into cancer  What increases my risk of colorectal polyps? The exact cause of colorectal polyps is unknown  The following may increase your risk:  · Older age    · A diet of foods high in fat and low in fiber     · Family history of polyps    · Intestinal diseases, such as Crohn's disease or ulcerative colitis    · An unhealthy lifestyle, such as physical inactivity, smoking, or drinking alcohol    · Obesity  What are the signs and symptoms of colorectal polyps? · Blood in your bowel movement or bleeding from the rectum    · Change in bowel movement habits, such as diarrhea and constipation    · Abdominal pain  How are colorectal polyps diagnosed? You should have fecal blood screening once a year for colorectal disease if you are over 48years old  You should be screened earlier if you have an intestinal disease or a family history of polyps or colorectal cancer  During this screening, a sample of your bowel movement is checked for blood, which may be an early sign of colorectal polyps or cancer   You may also need any of the following tests:  · Digital rectal exam:  Your healthcare provider will examine your anus and use a finger to check your rectum for polyps  · Barium enema: A barium enema is an x-ray of the colon  A tube is put into your anus, and a liquid called barium is put through the tube  Barium is used so that caregivers can see your colon better on the x-ray film  · Virtual colonoscopy: This is a CT scan that takes pictures of the inside of your colon and rectum  A small, flexible tube is put into your rectum and air or carbon dioxide (gas) is used to expand your colon  This lets healthcare providers clearly see your colon and any polyps on a monitor  · Colonoscopy or sigmoidoscopy: These procedures help your healthcare provider see the inside of your colon using a flexible tube with a small light and camera on the end  During a sigmoidoscopy, your healthcare provider will only look at rectum and lower colon  During a colonoscopy, healthcare providers will look at the full length of your colon  Healthcare providers may remove a small amount of tissue from the colon for a biopsy  How are colorectal polyps treated? · Polyp removal:  Polyps may be removed during your sigmoidoscopy or colonoscopy  · Polypectomy: This is surgery to remove your polyps  You may need laparoscopic or open surgery, depending on the type, size, and number of polyps that you have  Laparoscopy is done by inserting a small, flexible scope into incisions made on your abdomen  Open surgery is done by making a larger incision on your abdomen   What are the risks of colorectal polyps? You may bleed during a colonoscopy procedure  Your bowel may be perforated (torn) when polyps are removed  This may lead to an open abdominal surgery  During surgery, you may bleed too much or get an infection  Adenomatous polyps that are not removed may turn into cancer and become more difficult to treat     Where can I find support and more information? · [facility] ([facility])  [address] , [place] [address]  Phone: [phone]  Web Address: Diana Claudia  [facility] gov  When should I contact my healthcare provider? · You have a fever  · You have chills, a cough, or feel weak and achy  · You have abdominal pain that does not go away or gets worse after you take medicine  · Your abdomen is swollen  · You are losing weight without trying  · You have questions or concerns about your condition or care  When should I seek immediate care or call 911? · You have sudden shortness of breath  · You have a fast heart rate, fast breathing, or are too dizzy to stand up  · You have severe abdominal pain  · You see blood in your bowel movement  CARE AGREEMENT:   You have the right to help plan your care  Learn about your health condition and how it may be treated  Discuss treatment options with your caregivers to decide what care you want to receive  You always have the right to refuse treatment  The above information is an  only  It is not intended as medical advice for individual conditions or treatments  Talk to your doctor, nurse or pharmacist before following any medical regimen to see if it is safe and effective for you  © 2017 [address] Information is for End User's use only and may not be sold, redistributed or otherwise used for commercial purposes  All illustrations and images included in CareNotes® are the copyrighted property of [employer]  or Rey Craig

## 2018-10-23 NOTE — ANESTHESIA PREPROCEDURE EVALUATION
Review of Systems/Medical History          Cardiovascular  Hyperlipidemia,    Pulmonary  Smoker cigarette smoker  , Tobacco cessation counseling given ,        GI/Hepatic    GERD well controlled, Bowel prep            Endo/Other  Negative endo/other ROS      GYN  Negative gynecology ROS          Hematology  Negative hematology ROS      Musculoskeletal  Negative musculoskeletal ROS        Neurology  Negative neurology ROS      Psychology   Negative psychology ROS              Physical Exam    Airway    Mallampati score: I  TM Distance: >3 FB  Neck ROM: full     Dental   No notable dental hx     Cardiovascular  Rhythm: regular, Rate: normal, Cardiovascular exam normal    Pulmonary  Pulmonary exam normal Breath sounds clear to auscultation,     Other Findings        Anesthesia Plan  ASA Score- 2     Anesthesia Type- IV sedation with anesthesia with ASA Monitors  Additional Monitors:   Airway Plan:         Plan Factors- Patient instructed to abstain from smoking on day of procedure       Induction- intravenous  Postoperative Plan-     Informed Consent- Anesthetic plan and risks discussed with patient

## 2018-10-26 NOTE — PROGRESS NOTES
I called her with her results and left a voicemail that her next colonoscopy should be in 3 years because of her adenomatous polyps

## 2024-07-12 ENCOUNTER — TELEPHONE (OUTPATIENT)
Age: 64
End: 2024-07-12

## 2024-07-12 ENCOUNTER — PREP FOR PROCEDURE (OUTPATIENT)
Age: 64
End: 2024-07-12

## 2024-07-12 DIAGNOSIS — Z86.010 HISTORY OF COLON POLYPS: ICD-10-CM

## 2024-07-12 DIAGNOSIS — Z83.719 FAMILY HISTORY OF COLONIC POLYPS: Primary | ICD-10-CM

## 2024-07-12 NOTE — TELEPHONE ENCOUNTER
Scheduled date of colonoscopy (as of today): 9/16/24  Physician performing colonoscopy:   Location of colonoscopy: [facility]  Bowel prep reviewed with patient: Miralax Dulcolax prep instructions reviewed and sent via email  Instructions reviewed with patient by: md  Clearances:   ASC assessment completed

## 2024-07-12 NOTE — TELEPHONE ENCOUNTER
24  Screened by: Malinda Gramajo    Referring Provider     Pre- Screenin'1  134 lbs  BMI 25.3  Has patient been referred for a routine screening Colonoscopy? yes  Is the patient between 45-75 years old? yes      Previous Colonoscopy yes   If yes:    Date: 10/23/18    Facility: [facility]    Reason: Family history of colon polyps        Does the patient want to see a Gastroenterologist prior to their procedure OR are they having any GI symptoms? no    Has the patient been hospitalized or had abdominal surgery in the past 6 months? no    Does the patient use supplemental oxygen? no    Does the patient take Coumadin, Lovenox, Plavix, Elliquis, Xarelto, or other blood thinning medication? no    Has the patient had a stroke, cardiac event, or stent placed in the past year? no      If patient is between 45yrs - 49yrs, please advise patient that we will have to confirm benefits & coverage with their insurance company for a routine screening colonoscopy.

## 2024-09-03 ENCOUNTER — ANESTHESIA (OUTPATIENT)
Dept: ANESTHESIOLOGY | Facility: HOSPITAL | Age: 64
End: 2024-09-03

## 2024-09-03 ENCOUNTER — ANESTHESIA EVENT (OUTPATIENT)
Dept: ANESTHESIOLOGY | Facility: HOSPITAL | Age: 64
End: 2024-09-03

## 2024-09-11 RX ORDER — ALBUTEROL SULFATE 90 UG/1
2 INHALANT RESPIRATORY (INHALATION) EVERY 6 HOURS PRN
COMMUNITY

## 2024-09-16 ENCOUNTER — HOSPITAL ENCOUNTER (OUTPATIENT)
Dept: GASTROENTEROLOGY | Facility: MEDICAL CENTER | Age: 64
Setting detail: OUTPATIENT SURGERY
Discharge: HOME | End: 2024-09-16
Attending: INTERNAL MEDICINE
Payer: COMMERCIAL

## 2024-09-16 ENCOUNTER — ANESTHESIA EVENT (OUTPATIENT)
Dept: GASTROENTEROLOGY | Facility: MEDICAL CENTER | Age: 64
End: 2024-09-16
Payer: COMMERCIAL

## 2024-09-16 ENCOUNTER — ANESTHESIA (OUTPATIENT)
Dept: GASTROENTEROLOGY | Facility: MEDICAL CENTER | Age: 64
End: 2024-09-16
Payer: COMMERCIAL

## 2024-09-16 VITALS
WEIGHT: 135 LBS | BODY MASS INDEX: 25.49 KG/M2 | HEIGHT: 61 IN | HEART RATE: 62 BPM | OXYGEN SATURATION: 98 % | SYSTOLIC BLOOD PRESSURE: 135 MMHG | RESPIRATION RATE: 19 BRPM | TEMPERATURE: 96.3 F | DIASTOLIC BLOOD PRESSURE: 77 MMHG

## 2024-09-16 DIAGNOSIS — Z86.010 HISTORY OF COLON POLYPS: ICD-10-CM

## 2024-09-16 DIAGNOSIS — Z83.719 FAMILY HISTORY OF COLONIC POLYPS: ICD-10-CM

## 2024-09-16 PROCEDURE — 45385 COLONOSCOPY W/LESION REMOVAL: CPT | Performed by: INTERNAL MEDICINE

## 2024-09-16 PROCEDURE — 45380 COLONOSCOPY AND BIOPSY: CPT | Performed by: INTERNAL MEDICINE

## 2024-09-16 PROCEDURE — 88305 TISSUE EXAM BY PATHOLOGIST: CPT | Performed by: PATHOLOGY

## 2024-09-16 RX ORDER — PROPOFOL 10 MG/ML
INJECTION, EMULSION INTRAVENOUS AS NEEDED
Status: DISCONTINUED | OUTPATIENT
Start: 2024-09-16 | End: 2024-09-16

## 2024-09-16 RX ORDER — SODIUM CHLORIDE 9 MG/ML
125 INJECTION, SOLUTION INTRAVENOUS CONTINUOUS
Status: DISCONTINUED | OUTPATIENT
Start: 2024-09-16 | End: 2024-09-20 | Stop reason: HOSPADM

## 2024-09-16 RX ORDER — LIDOCAINE HYDROCHLORIDE 20 MG/ML
INJECTION, SOLUTION EPIDURAL; INFILTRATION; INTRACAUDAL; PERINEURAL AS NEEDED
Status: DISCONTINUED | OUTPATIENT
Start: 2024-09-16 | End: 2024-09-16

## 2024-09-16 RX ADMIN — LIDOCAINE HYDROCHLORIDE 50 MG: 20 INJECTION, SOLUTION EPIDURAL; INFILTRATION; INTRACAUDAL at 12:19

## 2024-09-16 RX ADMIN — PROPOFOL 50 MG: 10 INJECTION, EMULSION INTRAVENOUS at 12:32

## 2024-09-16 RX ADMIN — SODIUM CHLORIDE 125 ML/HR: 0.9 INJECTION, SOLUTION INTRAVENOUS at 12:12

## 2024-09-16 RX ADMIN — PROPOFOL 120 MCG/KG/MIN: 10 INJECTION, EMULSION INTRAVENOUS at 12:20

## 2024-09-16 RX ADMIN — PROPOFOL 100 MG: 10 INJECTION, EMULSION INTRAVENOUS at 12:19

## 2024-09-16 RX ADMIN — PROPOFOL 50 MG: 10 INJECTION, EMULSION INTRAVENOUS at 12:30

## 2024-09-16 NOTE — H&P
History and Physical - [facility]  Heaven Vasquez 63 y.o. female MRN: [id number]                  HPI: Heaven Vasquez is a 63 y.o. year old female who presents for history of colon polyps and family history of colon polyps.      REVIEW OF SYSTEMS: Per the HPI, and otherwise unremarkable.    Historical Information   Past Medical History:   Diagnosis Date    Colon polyp     GERD (gastroesophageal reflux disease)     Hyperlipidemia      Past Surgical History:   Procedure Laterality Date    COMBINED REDUCTION MAMMAPLASTY W/ LIPOSUCTION      CT NEEDLE BIOPSY LUNG  4/28/2023    [facility] COLONOSCOPY FLX DX W/COLLJ SPEC WHEN PFRMD N/A 10/23/2018    Procedure: COLONOSCOPY;  Surgeon: Jamil Kaur MD;  Location: [facility] GI LAB;  Service: Gastroenterology    VA ESOPHAGOGASTRODUODENOSCOPY TRANSORAL DIAGNOSTIC N/A 8/15/2018    Procedure: ESOPHAGOGASTRODUODENOSCOPY (EGD);  Surgeon: Jamil Kaur MD;  Location: [facility];  Service: Gastroenterology     Social History   Social History     Substance and Sexual Activity   Alcohol Use Yes    Alcohol/week: 2.0 standard drinks of alcohol    Types: 2 Glasses of wine per week    Comment: 2 glasses of wine a day     Social History     Substance and Sexual Activity   Drug Use No     Social History     Tobacco Use   Smoking Status Former    Current packs/day: 0.25    Types: Cigarettes   Smokeless Tobacco Never     History reviewed. No pertinent family history.    Meds/Allergies       Current Outpatient Medications:     Cholecalciferol (VITAMIN D3) 2000 units TABS    Multiple Vitamins-Minerals (MULTIVITAMIN ADULT PO)    albuterol (PROVENTIL HFA,VENTOLIN HFA) 90 mcg/act inhaler    Biotin 300 MCG TABS    MOVIPREP 100 g    omeprazole (PriLOSEC) 20 mg delayed release capsule    Current Facility-Administered Medications:     sodium chloride 0.9 % infusion, 125 mL/hr, Intravenous, Continuous    No Known Allergies    Objective     BP (!) 194/80   Pulse 72   Temp (!) 96.3 °F (35.7 °C)  "(Temporal)   Resp 16   Ht 5' 1\" (1.549 m)   Wt 61.2 kg (135 lb)   SpO2 99%   BMI 25.51 kg/m²       PHYSICAL EXAM    Gen: NAD  Head: NCAT  CV: RRR  CHEST: Clear  ABD: soft, NT/ND  EXT: no edema      ASSESSMENT/PLAN:  This is a 63 y.o. year old female here for colonoscopy, and she is stable and optimized for her procedure.        "

## 2024-09-16 NOTE — ANESTHESIA PREPROCEDURE EVALUATION
Procedure:  COLONOSCOPY    Relevant Problems   ANESTHESIA (within normal limits)      CARDIO   (+) Hyperlipidemia      GI/HEPATIC   (+) Esophageal dysphagia   (+) Gastroesophageal reflux disease without esophagitis      Surgery/Wound/Pain   (+) Epigastric pain      Other   (+) Family history of colonic polyps   (+) H. pylori infection        Physical Exam    Airway    Mallampati score: II  TM Distance: >3 FB  Neck ROM: full     Dental        Cardiovascular  Rhythm: regular, Rate: normal, Cardiovascular exam normal    Pulmonary  Pulmonary exam normal Breath sounds clear to auscultation    Other Findings  post-pubertal.      Anesthesia Plan  ASA Score- 2     Anesthesia Type- IV sedation with anesthesia with ASA Monitors.         Additional Monitors:     Airway Plan:            Plan Factors-Exercise tolerance (METS): >4 METS.    Chart reviewed.    Patient summary reviewed.    Patient is not a current smoker.              Induction-     Postoperative Plan-         Informed Consent- Anesthetic plan and risks discussed with patient.  I personally reviewed this patient with the CRNA. Discussed and agreed on the Anesthesia Plan with the CRNA..

## 2024-09-16 NOTE — ANESTHESIA POSTPROCEDURE EVALUATION
Post-Op Assessment Note    CV Status:  Stable  Pain Score: 0    Pain management: adequate       Mental Status:  Alert and awake   Hydration Status:  Euvolemic   PONV Controlled:  Controlled   Airway Patency:  Patent     Post Op Vitals Reviewed: Yes    No anethesia notable event occurred.    Staff: CRNA               BP   154/73   Temp      Pulse 69   Resp 16   SpO2 99

## 2024-09-19 PROCEDURE — 88305 TISSUE EXAM BY PATHOLOGIST: CPT | Performed by: PATHOLOGY

## 2024-09-28 NOTE — RESULT ENCOUNTER NOTE
My medical assistant will call her with her results.  The 3 rectal polyps were adenomas (precancerous) so repeat colonoscopy in 3 years.

## 2024-09-30 ENCOUNTER — TELEPHONE (OUTPATIENT)
Dept: GASTROENTEROLOGY | Facility: CLINIC | Age: 64
End: 2024-09-30

## 2024-09-30 NOTE — TELEPHONE ENCOUNTER
Called pt , left message to call the office for results to her colonoscopy and 3 yr recall. Recall in the chart.  The 3 rectal polyps were adenomas (precancerous) so repeat colonoscopy in 3 years.

## 2024-10-02 NOTE — TELEPHONE ENCOUNTER
"Pt returned Pamela's call re: results. I read pt Dr. Contreras's  note: \"The 3 rectal polyps were adenomas (precancerous) so repeat colonoscopy in 3 years.\" Pt understood and had no questions. Pt aware recall date of 9/16/2027.  "